# Patient Record
Sex: FEMALE | Race: WHITE | Employment: OTHER | URBAN - METROPOLITAN AREA
[De-identification: names, ages, dates, MRNs, and addresses within clinical notes are randomized per-mention and may not be internally consistent; named-entity substitution may affect disease eponyms.]

---

## 2018-08-29 ENCOUNTER — TELEPHONE (OUTPATIENT)
Dept: SURGERY | Age: 53
End: 2018-08-29

## 2018-08-29 ENCOUNTER — INITIAL CONSULT (OUTPATIENT)
Dept: SURGERY | Age: 53
End: 2018-08-29
Payer: MEDICARE

## 2018-08-29 VITALS
DIASTOLIC BLOOD PRESSURE: 86 MMHG | HEIGHT: 63 IN | BODY MASS INDEX: 34.02 KG/M2 | WEIGHT: 192 LBS | SYSTOLIC BLOOD PRESSURE: 128 MMHG | HEART RATE: 82 BPM

## 2018-08-29 DIAGNOSIS — M79.89 MASS OF SOFT TISSUE OF LEFT UPPER EXTREMITY: ICD-10-CM

## 2018-08-29 PROCEDURE — 99203 OFFICE O/P NEW LOW 30 MIN: CPT | Performed by: SURGERY

## 2018-08-29 NOTE — PROGRESS NOTES
Systems  Review of Systems -  General ROS: negative for - chills, fatigue or malaise  ENT ROS: negative for - hearing change, nasal congestion or nasal discharge  Allergy and Immunology ROS: negative for - hives, itchy/watery eyes or nasal congestion  Hematological and Lymphatic ROS: negative for - blood clots, blood transfusions, bruising or fatigue  Endocrine ROS: negative for - malaise/lethargy, mood swings, palpitations or polydipsia/polyuria  Respiratory ROS: negative for - sputum changes, stridor, tachypnea or wheezing  Cardiovascular ROS: negative for - irregular heartbeat, loss of consciousness, murmur or orthopnea  Gastrointestinal ROS: negative for - constipation, diarrhea, gas/bloating, heartburn or hematemesis  Genito-Urinary ROS: negative for -  genital discharge, genital ulcers or hematuria  Musculoskeletal ROS: negative for - gait disturbance, muscle pain or muscular weakness  Psych/Soc ROS: Neg suicidal ideation or visual/auditory hallucinations    Physical exam:   /86 (Site: Right Arm, Position: Sitting, Cuff Size: Large Adult)   Pulse 82   Ht 5' 3\" (1.6 m)   Wt 192 lb (87.1 kg)   BMI 34.01 kg/m²   General appearance:  NAD  Head: NCAT, PERRLA, EOMI, red conjunctiva  Neck: supple, no masses  Lungs: CTAB, equal chest rise bilateral  Heart: Reg rate  Abdomen: soft, nontender, nondistended  Rectal: No bleeding  Skin; soft tissue mass of the 2  cm, mobile, no drainage, no cellulitis  Gu: no cva tenderness  Extremities: extremities normal, atraumatic, no cyanosis or edema  Psychosocial: No auditory or visual hallucinations      Assessment:  Tapan Hodges is a 52 y.o. female with soft tissue neoplasm of the left upper arm  Patient Active Problem List   Diagnosis    Mass of soft tissue of left upper extremity         Plan:  OR for excision soft tissue neoplasm of left upper arm, likely lipoma vs sebaceous ceys  Discussed the risk, benefits and alternatives of surgery including wound infections, bleeding, scar and recurrent mass formation and the risks of general anesthetic including MI, CVA, sudden death or reactions to anesthetic medications. The patient understands the risks and alternatives and the possibility of converting to an open procedure. All questions were answered to the patient's satisfaction and they freely signed the consent.        Diane Sotomayor MD  1:49 PM  8/29/2018

## 2018-08-31 ENCOUNTER — TELEPHONE (OUTPATIENT)
Dept: SURGERY | Age: 53
End: 2018-08-31

## 2018-08-31 NOTE — TELEPHONE ENCOUNTER
Called pt to schedule follow up appointment for excision soft tissue neoplasm scheduled on 9/4/18. Pt states she would like to schedule later in the year. Confirmed pt would like to cancel. Pt states she will call back when she is ready to schedule  Called surgery scheduling and spoke with Sadie Vizcarra and cancelled surgery fir 9/4/18.  Electronically signed by Jeanine Kilpatrick on 8/31/2018 at 8:42 AM

## 2018-09-13 NOTE — TELEPHONE ENCOUNTER
Patient cancelled procedure. See phone encounter 8.31.18. Patient will be calling back to reschedule procedure at a later date.   Electronically signed by Tracy Marsh on 9/13/18 at 8:17 AM

## 2020-01-15 ENCOUNTER — HOSPITAL ENCOUNTER (OUTPATIENT)
Dept: PULMONOLOGY | Age: 55
Discharge: HOME | End: 2020-01-15
Payer: MEDICARE

## 2020-01-15 PROCEDURE — 94070 EVALUATION OF WHEEZING: CPT

## 2020-01-15 PROCEDURE — 6360000002 HC RX W HCPCS: Performed by: INTERNAL MEDICINE

## 2020-01-15 RX ADMIN — METHACHOLINE CHLORIDE 100 MG: 100 POWDER, FOR SOLUTION RESPIRATORY (INHALATION) at 13:02

## 2020-01-19 NOTE — PROCEDURES
[address]                               PULMONARY FUNCTION    PATIENT NAME: Nano Stephen                  :        1965  MED REC NO:   [id number]                            ROOM:  ACCOUNT NO:   [de-identified]                           ADMIT DATE: 01/15/2020  PROVIDER:     Jj Emmanuel MD    DATE OF PROCEDURE:  01/15/2020    METHACHOLINE CHALLENGE TEST    ATTENDING:  Jj Emmanuel MD    PULMONOLOGIST:  Jj Emmanuel MD    In conclusion, this is a negative methacholine challenge test by  spirometric criteria, but the patient was symptomatic as per the RRT  where there was evidence of loud audible wheezing. The patient  developed some tachypnea. Saturations remain good at 100%, felt dizzy,  lightheaded. Was giving nebulized treatments x2. This certainly has to  be correlated clinically. Asthma is still present in the differential  diagnosis.         Chrissie Flanagan MD    D: 2020 12:08:54       T: 2020 13:31:41     [id number]  Job#: [id number]     Doc#: [id number]    CC:

## 2020-03-31 ENCOUNTER — HOSPITAL ENCOUNTER (EMERGENCY)
Age: 55
Discharge: HOME | End: 2020-03-31
Attending: EMERGENCY MEDICINE
Payer: MEDICARE

## 2020-03-31 VITALS
TEMPERATURE: 99.4 F | OXYGEN SATURATION: 95 % | WEIGHT: 201 LBS | BODY MASS INDEX: 35.61 KG/M2 | DIASTOLIC BLOOD PRESSURE: 82 MMHG | RESPIRATION RATE: 20 BRPM | SYSTOLIC BLOOD PRESSURE: 126 MMHG | HEIGHT: 63 IN | HEART RATE: 99 BPM

## 2020-03-31 PROCEDURE — 99284 EMERGENCY DEPT VISIT MOD MDM: CPT

## 2020-03-31 PROCEDURE — 94664 DEMO&/EVAL PT USE INHALER: CPT

## 2020-03-31 PROCEDURE — 6370000000 HC RX 637 (ALT 250 FOR IP): Performed by: EMERGENCY MEDICINE

## 2020-03-31 PROCEDURE — 2500000003 HC RX 250 WO HCPCS: Performed by: EMERGENCY MEDICINE

## 2020-03-31 PROCEDURE — 94640 AIRWAY INHALATION TREATMENT: CPT

## 2020-03-31 RX ORDER — PREDNISONE 20 MG/1
60 TABLET ORAL ONCE
Status: COMPLETED | OUTPATIENT
Start: 2020-03-31 | End: 2020-03-31

## 2020-03-31 RX ORDER — IPRATROPIUM BROMIDE AND ALBUTEROL SULFATE 2.5; .5 MG/3ML; MG/3ML
3 SOLUTION RESPIRATORY (INHALATION) ONCE
Status: COMPLETED | OUTPATIENT
Start: 2020-03-31 | End: 2020-03-31

## 2020-03-31 RX ORDER — PROMETHAZINE HYDROCHLORIDE AND CODEINE PHOSPHATE 6.25; 1 MG/5ML; MG/5ML
5 SYRUP ORAL 3 TIMES DAILY PRN
Qty: 60 ML | Refills: 0 | Status: SHIPPED | OUTPATIENT
Start: 2020-03-31 | End: 2020-04-04

## 2020-03-31 RX ORDER — PREDNISONE 20 MG/1
TABLET ORAL
Qty: 10 TABLET | Refills: 0 | Status: SHIPPED | OUTPATIENT
Start: 2020-03-31 | End: 2020-04-10

## 2020-03-31 RX ORDER — LIDOCAINE HYDROCHLORIDE 10 MG/ML
5 INJECTION, SOLUTION EPIDURAL; INFILTRATION; INTRACAUDAL; PERINEURAL ONCE
Status: COMPLETED | OUTPATIENT
Start: 2020-03-31 | End: 2020-03-31

## 2020-03-31 RX ADMIN — PREDNISONE 60 MG: 20 TABLET ORAL at 10:01

## 2020-03-31 RX ADMIN — IPRATROPIUM BROMIDE AND ALBUTEROL SULFATE 3 AMPULE: .5; 3 SOLUTION RESPIRATORY (INHALATION) at 10:00

## 2020-03-31 RX ADMIN — LIDOCAINE HYDROCHLORIDE 5 ML: 10 INJECTION, SOLUTION EPIDURAL; INFILTRATION; INTRACAUDAL; PERINEURAL at 10:00

## 2020-03-31 ASSESSMENT — ENCOUNTER SYMPTOMS
SHORTNESS OF BREATH: 1
NAUSEA: 0
COUGH: 1
WHEEZING: 1
ABDOMINAL DISTENTION: 0
EYE PAIN: 0
SORE THROAT: 0
BACK PAIN: 0
EYE REDNESS: 0
DIARRHEA: 0
VOMITING: 0
SPUTUM PRODUCTION: 0
SINUS PRESSURE: 0
EYE DISCHARGE: 0

## 2020-03-31 NOTE — ED PROVIDER NOTES
Patient with history of asthma. She states is been a bit worse of recent. Last steroids several months ago. Follows with Dr. Carrion  for pulmonology. She states she called her primary care physician's office to get a course of steroids but, was told that she go to the hospital.    The history is provided by the patient. Shortness of Breath   Severity:  Mild  Onset quality:  Gradual  Duration:  2 days  Timing:  Constant  Progression:  Waxing and waning  Chronicity:  Recurrent  Relieved by:  Nothing  Worsened by:  Nothing  Ineffective treatments:  Inhaler  Associated symptoms: cough and wheezing    Associated symptoms: no chest pain, no diaphoresis, no ear pain, no fever, no headaches, no neck pain, no rash, no sore throat, no sputum production and no vomiting         Review of Systems   Constitutional: Negative for chills, diaphoresis and fever. HENT: Negative for ear pain, sinus pressure and sore throat. Eyes: Negative for pain, discharge and redness. Respiratory: Positive for cough, shortness of breath and wheezing. Negative for sputum production. Cardiovascular: Negative for chest pain. Gastrointestinal: Negative for abdominal distention, diarrhea, nausea and vomiting. Genitourinary: Negative for dysuria and frequency. Musculoskeletal: Negative for arthralgias, back pain and neck pain. Skin: Negative for rash and wound. Neurological: Negative for weakness and headaches. Hematological: Negative for adenopathy. All other systems reviewed and are negative. Physical Exam  Vitals signs and nursing note reviewed. Constitutional:       Appearance: She is well-developed. HENT:      Head: Normocephalic and atraumatic. Eyes:      Pupils: Pupils are equal, round, and reactive to light. Neck:      Musculoskeletal: Normal range of motion and neck supple. Cardiovascular:      Rate and Rhythm: Normal rate and regular rhythm. Heart sounds: Normal heart sounds. No murmur. PROGRESS NOTES ------------------------------------------  11:44 AM EDT  I have spoken with the patient and discussed todays results, in addition to providing specific details for the plan of care and counseling regarding the diagnosis and prognosis. Their questions are answered at this time and they are agreeable with the plan. I discussed at length with them reasons for immediate return here for re evaluation. They will followup with their primary care physician by calling their office tomorrow. Patient feels much better following treatment. Cough has resolved.  --------------------------------- ADDITIONAL PROVIDER NOTES ---------------------------------  At this time the patient is without objective evidence of an acute process requiring hospitalization or inpatient management. They have remained hemodynamically stable throughout their entire ED visit and are stable for discharge with outpatient follow-up. The plan has been discussed in detail and they are aware of the specific conditions for emergent return, as well as the importance of follow-up. New Prescriptions    PREDNISONE (DELTASONE) 20 MG TABLET    Two tablets daily for 5 days    PROMETHAZINE-CODEINE (PHENERGAN WITH CODEINE) 6.25-10 MG/5ML SYRUP    Take 5 mLs by mouth 3 times daily as needed for Cough for up to 4 days. Diagnosis:  1. Mild intermittent asthma with exacerbation    2. Cough        Disposition:  Patient's disposition: Discharge to home  Patient's condition is stable.           Wenrozina DO Brian  03/31/20 1149

## 2020-04-01 ENCOUNTER — CARE COORDINATION (OUTPATIENT)
Dept: CASE MANAGEMENT | Age: 55
End: 2020-04-01

## 2020-04-03 ENCOUNTER — CARE COORDINATION (OUTPATIENT)
Dept: CASE MANAGEMENT | Age: 55
End: 2020-04-03

## 2021-03-03 ENCOUNTER — HOSPITAL ENCOUNTER (EMERGENCY)
Age: 56
Discharge: HOME | End: 2021-03-03
Attending: EMERGENCY MEDICINE
Payer: MEDICARE

## 2021-03-03 ENCOUNTER — APPOINTMENT (OUTPATIENT)
Dept: CT IMAGING | Age: 56
End: 2021-03-03
Payer: MEDICARE

## 2021-03-03 VITALS
RESPIRATION RATE: 16 BRPM | TEMPERATURE: 98.9 F | DIASTOLIC BLOOD PRESSURE: 103 MMHG | BODY MASS INDEX: 35.44 KG/M2 | HEART RATE: 101 BPM | SYSTOLIC BLOOD PRESSURE: 199 MMHG | OXYGEN SATURATION: 94 % | HEIGHT: 63 IN | WEIGHT: 200 LBS

## 2021-03-03 DIAGNOSIS — S16.1XXA ACUTE STRAIN OF NECK MUSCLE, INITIAL ENCOUNTER: Primary | ICD-10-CM
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
## 2021-03-03 PROCEDURE — 72125 CT NECK SPINE W/O DYE: CPT

## 2021-03-03 PROCEDURE — 99283 EMERGENCY DEPT VISIT LOW MDM: CPT

## 2021-03-03 RX ORDER — MAGNESIUM OXIDE 400 MG/1
400 TABLET ORAL 2 TIMES DAILY
COMMUNITY

## 2021-03-03 RX ORDER — ROSUVASTATIN CALCIUM 10 MG/1
5 TABLET, COATED ORAL DAILY
COMMUNITY

## 2021-03-03 RX ORDER — CETIRIZINE HYDROCHLORIDE 10 MG/1
10 TABLET ORAL DAILY
COMMUNITY

## 2021-03-03 RX ORDER — CALCIUM CARBONATE 200(500)MG
1 TABLET,CHEWABLE ORAL DAILY
COMMUNITY
End: 2021-08-24

## 2021-03-03 RX ORDER — LISINOPRIL 10 MG/1
10 TABLET ORAL DAILY
COMMUNITY

## 2021-03-03 RX ORDER — BUDESONIDE AND FORMOTEROL FUMARATE DIHYDRATE 160; 4.5 UG/1; UG/1
2 AEROSOL RESPIRATORY (INHALATION) 2 TIMES DAILY
COMMUNITY

## 2021-03-03 RX ORDER — MONTELUKAST SODIUM 10 MG/1
10 TABLET ORAL NIGHTLY
COMMUNITY

## 2021-03-03 RX ORDER — CHLORAL HYDRATE 500 MG
1000 CAPSULE ORAL DAILY
COMMUNITY
End: 2021-08-24

## 2021-03-03 RX ORDER — DOCUSATE SODIUM 100 MG/1
200 CAPSULE, LIQUID FILLED ORAL NIGHTLY
COMMUNITY

## 2021-03-03 RX ORDER — FLUOXETINE HYDROCHLORIDE 40 MG/1
40 CAPSULE ORAL DAILY
COMMUNITY

## 2021-03-03 RX ORDER — OYSTER SHELL CALCIUM WITH VITAMIN D 500; 200 MG/1; [IU]/1
1 TABLET, FILM COATED ORAL DAILY
COMMUNITY
End: 2021-08-24

## 2021-03-03 RX ORDER — VITAMIN B COMPLEX
1 CAPSULE ORAL DAILY
COMMUNITY
End: 2021-08-24

## 2021-03-03 RX ORDER — MORPHINE SULFATE 30 MG/1
30 TABLET, FILM COATED, EXTENDED RELEASE ORAL EVERY 12 HOURS
COMMUNITY
End: 2021-08-24

## 2021-03-03 ASSESSMENT — PAIN DESCRIPTION - FREQUENCY: FREQUENCY: CONTINUOUS

## 2021-03-03 ASSESSMENT — PAIN DESCRIPTION - LOCATION: LOCATION: HEAD

## 2021-03-03 ASSESSMENT — ENCOUNTER SYMPTOMS
NAUSEA: 0
VOMITING: 0
FACIAL SWELLING: 0
SHORTNESS OF BREATH: 0
BACK PAIN: 0
ABDOMINAL PAIN: 0
TROUBLE SWALLOWING: 0
CONTUSION: 0

## 2021-03-03 ASSESSMENT — PAIN DESCRIPTION - PAIN TYPE: TYPE: ACUTE PAIN

## 2021-03-03 ASSESSMENT — PAIN DESCRIPTION - DESCRIPTORS: DESCRIPTORS: ACHING;SHARP
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
## 2021-03-23 ENCOUNTER — HOSPITAL ENCOUNTER (OUTPATIENT)
Dept: MRI IMAGING | Age: 56
Discharge: HOME | End: 2021-03-25
Payer: MEDICARE

## 2021-03-23 DIAGNOSIS — M54.2 CERVICALGIA: ICD-10-CM

## 2021-03-23 DIAGNOSIS — M54.50 ACUTE LOW BACK PAIN, UNSPECIFIED BACK PAIN LATERALITY, UNSPECIFIED WHETHER SCIATICA PRESENT: ICD-10-CM

## 2021-03-23 PROCEDURE — 72141 MRI NECK SPINE W/O DYE: CPT

## 2021-03-23 PROCEDURE — 72148 MRI LUMBAR SPINE W/O DYE: CPT

## 2021-08-24 ENCOUNTER — OFFICE VISIT (OUTPATIENT)
Dept: ORTHOPEDIC SURGERY | Age: 56
End: 2021-08-24
Payer: MEDICARE

## 2021-08-24 VITALS — TEMPERATURE: 98 F | WEIGHT: 176 LBS | HEIGHT: 63 IN | BODY MASS INDEX: 31.18 KG/M2

## 2021-08-24 DIAGNOSIS — M70.62 TROCHANTERIC BURSITIS, LEFT HIP: Primary | ICD-10-CM

## 2021-08-24 PROCEDURE — 99203 OFFICE O/P NEW LOW 30 MIN: CPT | Performed by: ORTHOPAEDIC SURGERY

## 2021-08-24 PROCEDURE — 4004F PT TOBACCO SCREEN RCVD TLK: CPT | Performed by: ORTHOPAEDIC SURGERY

## 2021-08-24 PROCEDURE — 20610 DRAIN/INJ JOINT/BURSA W/O US: CPT | Performed by: ORTHOPAEDIC SURGERY

## 2021-08-24 PROCEDURE — G8417 CALC BMI ABV UP PARAM F/U: HCPCS | Performed by: ORTHOPAEDIC SURGERY

## 2021-08-24 PROCEDURE — 3017F COLORECTAL CA SCREEN DOC REV: CPT | Performed by: ORTHOPAEDIC SURGERY

## 2021-08-24 PROCEDURE — G8427 DOCREV CUR MEDS BY ELIG CLIN: HCPCS | Performed by: ORTHOPAEDIC SURGERY

## 2021-08-24 RX ORDER — GABAPENTIN 100 MG/1
CAPSULE ORAL
COMMUNITY
Start: 2021-08-05

## 2021-08-24 RX ORDER — TRIAMCINOLONE ACETONIDE 40 MG/ML
40 INJECTION, SUSPENSION INTRA-ARTICULAR; INTRAMUSCULAR ONCE
Status: COMPLETED | OUTPATIENT
Start: 2021-08-24 | End: 2021-08-24

## 2021-08-24 RX ADMIN — TRIAMCINOLONE ACETONIDE 40 MG: 40 INJECTION, SUSPENSION INTRA-ARTICULAR; INTRAMUSCULAR at 10:33

## 2021-11-19 ENCOUNTER — APPOINTMENT (OUTPATIENT)
Dept: GENERAL RADIOLOGY | Age: 56
End: 2021-11-19
Payer: MEDICARE

## 2021-11-19 ENCOUNTER — HOSPITAL ENCOUNTER (EMERGENCY)
Age: 56
Discharge: LEFT BEFORE DISCHARGE | End: 2021-11-19
Payer: MEDICARE

## 2021-11-19 VITALS
DIASTOLIC BLOOD PRESSURE: 56 MMHG | TEMPERATURE: 99 F | SYSTOLIC BLOOD PRESSURE: 102 MMHG | OXYGEN SATURATION: 92 % | RESPIRATION RATE: 19 BRPM | HEART RATE: 116 BPM

## 2021-11-19 LAB
ANION GAP SERPL CALCULATED.3IONS-SCNC: 12 MMOL/L (ref 7–16)
BASOPHILS ABSOLUTE: 0.02 E9/L (ref 0–0.2)
BASOPHILS RELATIVE PERCENT: 0.4 % (ref 0–2)
BUN BLDV-MCNC: 17 MG/DL (ref 6–20)
CALCIUM SERPL-MCNC: 8.9 MG/DL (ref 8.6–10.2)
CHLORIDE BLD-SCNC: 102 MMOL/L (ref 98–107)
CO2: 24 MMOL/L (ref 22–29)
CREAT SERPL-MCNC: 1.1 MG/DL (ref 0.5–1)
D DIMER: 313 NG/ML DDU
EOSINOPHILS ABSOLUTE: 0.03 E9/L (ref 0.05–0.5)
EOSINOPHILS RELATIVE PERCENT: 0.7 % (ref 0–6)
GFR AFRICAN AMERICAN: >60
GFR NON-AFRICAN AMERICAN: 51 ML/MIN/1.73
GLUCOSE BLD-MCNC: 101 MG/DL (ref 74–99)
HCT VFR BLD CALC: 37.3 % (ref 34–48)
HEMOGLOBIN: 12 G/DL (ref 11.5–15.5)
IMMATURE GRANULOCYTES #: 0.02 E9/L
IMMATURE GRANULOCYTES %: 0.4 % (ref 0–5)
LYMPHOCYTES ABSOLUTE: 1.01 E9/L (ref 1.5–4)
LYMPHOCYTES RELATIVE PERCENT: 22.2 % (ref 20–42)
MCH RBC QN AUTO: 27 PG (ref 26–35)
MCHC RBC AUTO-ENTMCNC: 32.2 % (ref 32–34.5)
MCV RBC AUTO: 83.8 FL (ref 80–99.9)
MONOCYTES ABSOLUTE: 0.38 E9/L (ref 0.1–0.95)
MONOCYTES RELATIVE PERCENT: 8.4 % (ref 2–12)
NEUTROPHILS ABSOLUTE: 3.08 E9/L (ref 1.8–7.3)
NEUTROPHILS RELATIVE PERCENT: 67.9 % (ref 43–80)
PDW BLD-RTO: 12.5 FL (ref 11.5–15)
PLATELET # BLD: 111 E9/L (ref 130–450)
PMV BLD AUTO: 10.3 FL (ref 7–12)
POTASSIUM REFLEX MAGNESIUM: 4.7 MMOL/L (ref 3.5–5)
PRO-BNP: <5 PG/ML (ref 0–125)
RBC # BLD: 4.45 E12/L (ref 3.5–5.5)
SARS-COV-2, NAAT: DETECTED
SODIUM BLD-SCNC: 138 MMOL/L (ref 132–146)
TROPONIN, HIGH SENSITIVITY: <6 NG/L (ref 0–9)
WBC # BLD: 4.5 E9/L (ref 4.5–11.5)

## 2021-11-19 PROCEDURE — 87635 SARS-COV-2 COVID-19 AMP PRB: CPT

## 2021-11-19 PROCEDURE — 71046 X-RAY EXAM CHEST 2 VIEWS: CPT

## 2021-11-19 PROCEDURE — 4500000002 HC ER NO CHARGE

## 2021-11-19 PROCEDURE — 85025 COMPLETE CBC W/AUTO DIFF WBC: CPT

## 2021-11-19 PROCEDURE — 80048 BASIC METABOLIC PNL TOTAL CA: CPT

## 2021-11-19 PROCEDURE — 85378 FIBRIN DEGRADE SEMIQUANT: CPT

## 2021-11-19 PROCEDURE — 84484 ASSAY OF TROPONIN QUANT: CPT

## 2021-11-19 PROCEDURE — 83880 ASSAY OF NATRIURETIC PEPTIDE: CPT

## 2021-11-19 PROCEDURE — 93005 ELECTROCARDIOGRAM TRACING: CPT | Performed by: NURSE PRACTITIONER

## 2021-11-19 NOTE — ED NOTES
Patient's name called X 3 with no answer. Called CT to see if pt was over there and CT tech French Weathers stated that the pt was not over in CT.      Reagan Underwood RN  11/19/21 0646

## 2021-11-19 NOTE — ED TRIAGE NOTES
FIRST PROVIDER CONTACT ASSESSMENT NOTE           Department of Emergency Medicine                 First Provider Note            21  12:26 PM EST    Date of Encounter: No admission date for patient encounter. Patient Name: Peace Harley  : 1965  MRN: [id number]    Chief Complaint: No chief complaint on file. History of Present Illness:   Peace Harley is a 60 y.o. female who presents to the ED for chest pain, sob, cough, exposure to covid 19. Symptoms started 7 days ago. Reports low grade fever as well. History of asthma. Did not receive the covid 19 vaccine. Focused Physical Exam:  VS:    ED Triage Vitals [21 1159]   BP Temp Temp Source Pulse Resp SpO2 Height Weight   -- 96.2 °F (35.7 °C) Temporal 120 -- 96 % -- --        Physical Ex: Constitutional: Alert and non-toxic. Medical History:  has a past medical history of Arthritis, Asthma, Fibromyalgia, Hyperlipidemia, Osteoporosis, and Pancreatitis. Surgical History:  has a past surgical history that includes Cervix surgery; Hysterectomy; Cholecystectomy; Appendectomy; and Tonsillectomy. Social History:  reports that she has never smoked. She does not have any smokeless tobacco history on file. She reports that she does not drink alcohol. Family History: family history is not on file.     Allergies: Sumatriptan     Initial Plan of Care: Initiate Treatment-Testing, Proceed toTreatment Area When Bed Available for ED Attending/MLP to Continue Care      ---END OF FIRST PROVIDER CONTACT ASSESSMENT NOTE---  Electronically signed by REGGIE Villavicencio CNP   DD: 21

## 2021-11-20 LAB
EKG ATRIAL RATE: 113 BPM
EKG P AXIS: 43 DEGREES
EKG P-R INTERVAL: 158 MS
EKG Q-T INTERVAL: 336 MS
EKG QRS DURATION: 80 MS
EKG QTC CALCULATION (BAZETT): 460 MS
EKG R AXIS: 50 DEGREES
EKG T AXIS: 23 DEGREES
EKG VENTRICULAR RATE: 113 BPM

## 2021-11-22 ENCOUNTER — CARE COORDINATION (OUTPATIENT)
Dept: CARE COORDINATION | Age: 56
End: 2021-11-22

## 2021-11-22 NOTE — CARE COORDINATION
Patient contacted regarding COVID-19 risk, exposure, diagnosis, pulse oximeter ordered at discharge and monoclonal antibody infusion follow up. Discussed COVID-19 related testing which was available at this time. Test results were positive. Patient informed of results, if available? Yes. LPN Care Coordinator contacted the patient by telephone to perform post discharge assessment. Call within 2 business days of discharge: Yes. Verified name and  with patient as identifiers. Provided introduction to self, and explanation of the CTN/ACM role, and reason for call due to risk factors for infection and/or exposure to COVID-19. Symptoms reviewed with patient who verbalized the following symptoms: fatigue, pain or aching joints and cough. Due to no new or worsening symptoms encounter was not routed to provider for escalation. Discussed follow-up appointments. If no appointment was previously scheduled, appointment scheduling offered: No.  [facility] follow up appointment(s): No future appointments. Non-[facility] follow up appointment(s): pt states she is feeling ok. Reviewed cdc guidelines and reviewed pulse ox usage and pt is to follow up with pcp reminded pt to return to er if symptoms persist or worsen    Non-face-to-face services provided:  Obtained and reviewed discharge summary and/or continuity of care documents     Advance Care Planning:   Does patient have an Advance Directive:  not on file. Educated patient about risk for severe COVID-19 due to risk factors according to CDC guidelines. LPN CC reviewed discharge instructions, medical action plan and red flag symptoms with the patient who verbalized understanding. Discussed COVID vaccination status: Yes. Education provided on COVID-19 vaccination as appropriate. Discussed exposure protocols and quarantine with CDC Guidelines.  Patient was given an opportunity to verbalize any questions and concerns and agrees to contact LPN CC or health care provider for questions related to their healthcare. Reviewed and educated patient on any new and changed medications related to discharge diagnosis     Was patient discharged with a pulse oximeter? No Discussed and confirmed pulse oximeter discharge instructions and when to notify provider or seek emergency care. LPN CC provided contact information. No further follow-up call identified based on severity of symptoms and risk factors.

## 2022-03-04 ENCOUNTER — HOSPITAL ENCOUNTER (OUTPATIENT)
Dept: GENERAL RADIOLOGY | Age: 57
Discharge: HOME | End: 2022-03-06
Payer: MEDICARE

## 2022-03-04 ENCOUNTER — HOSPITAL ENCOUNTER (OUTPATIENT)
Age: 57
Discharge: HOME | End: 2022-03-06
Payer: MEDICARE

## 2022-03-04 DIAGNOSIS — R91.8 OTHER NONSPECIFIC ABNORMAL FINDING OF LUNG FIELD: ICD-10-CM

## 2022-03-04 PROCEDURE — 71046 X-RAY EXAM CHEST 2 VIEWS: CPT

## 2022-04-15 DIAGNOSIS — R20.0 NUMBNESS AND TINGLING IN LEFT ARM: Primary | ICD-10-CM

## 2022-04-15 DIAGNOSIS — R20.2 NUMBNESS AND TINGLING IN LEFT ARM: Primary | ICD-10-CM

## 2022-04-20 ENCOUNTER — OFFICE VISIT (OUTPATIENT)
Dept: PHYSICAL MEDICINE AND REHAB | Age: 57
End: 2022-04-20
Payer: MEDICARE

## 2022-04-20 VITALS — HEIGHT: 63 IN | BODY MASS INDEX: 35.44 KG/M2 | WEIGHT: 200 LBS

## 2022-04-20 DIAGNOSIS — R20.2 NUMBNESS AND TINGLING IN LEFT ARM: ICD-10-CM

## 2022-04-20 DIAGNOSIS — R20.0 NUMBNESS AND TINGLING IN LEFT ARM: Primary | ICD-10-CM

## 2022-04-20 DIAGNOSIS — R20.0 NUMBNESS AND TINGLING IN LEFT ARM: ICD-10-CM

## 2022-04-20 DIAGNOSIS — R20.2 NUMBNESS AND TINGLING IN LEFT ARM: Primary | ICD-10-CM

## 2022-04-20 PROCEDURE — 95886 MUSC TEST DONE W/N TEST COMP: CPT | Performed by: PHYSICAL MEDICINE & REHABILITATION

## 2022-04-20 PROCEDURE — 95909 NRV CNDJ TST 5-6 STUDIES: CPT | Performed by: PHYSICAL MEDICINE & REHABILITATION

## 2022-04-20 PROCEDURE — 99203 OFFICE O/P NEW LOW 30 MIN: CPT | Performed by: PHYSICAL MEDICINE & REHABILITATION

## 2022-04-20 PROCEDURE — G8428 CUR MEDS NOT DOCUMENT: HCPCS | Performed by: PHYSICAL MEDICINE & REHABILITATION

## 2022-04-20 PROCEDURE — G8417 CALC BMI ABV UP PARAM F/U: HCPCS | Performed by: PHYSICAL MEDICINE & REHABILITATION
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
## 2022-04-20 NOTE — PATIENT INSTRUCTIONS
Electrodiagnotic Laboratory  Accredited by the [facility] with Exemplary status  EUNICE Vasquez Ace, D.O. [facility]  [address] [facility] [place]  Phone: [phone]  Fax: [phone]        Today you had an electrodiagnostic exam which included nerve conduction studies (NCS) and electromyography (EMG). This test evaluated the electrical activity of your nerves and muscles to help determine if you have a nerve or muscle disease. This test can help determine the location and type of a nerve or muscle problem. This will help your referring doctor diagnose your condition and determine the appropriate next step in your treatment plan. After your test:    1. There are no long lasting side effects of the test.     2. You may resume your normal activities without restrictions. 3.  Resume any medications that were stopped for the test.     4  If you have sore areas or bruising in your muscles where the needle was placed, apply a cold pack to the sore area for 15-20 minutes three to four times a day as needed for pain. The soreness should go away in about 1-2 days. 5. Your results were provided  Briefly at the end of your test and the final detailed report will be provided to your referring physician, and/or primary care physician and any other parties you requested within 1-2 days of the examination. You may wish to contact your referring provider after a few days to determine what they would like you to do next. 6.  Please call [phone] with any questions or concerns and if you develop increased body temperature/fever, swelling, tenderness, increased pain and/or drainage from the sites where the needle was placed. Thank you for choosing us for your health care needs.

## 2023-08-21 NOTE — PROGRESS NOTES
10 mg by mouth daily, Disp: , Rfl:     influenza virus trivalent vaccine (FLUZONE) injection, Inject 0.5 mLs into the muscle once Given 1/2021, Disp: , Rfl:     albuterol (PROVENTIL HFA;VENTOLIN HFA) 108 (90 BASE) MCG/ACT inhaler, Inhale 2 puffs into the lungs every 6 hours as needed for Wheezing., Disp: , Rfl:     cyclobenzaprine (FLEXERIL) 10 MG tablet, Take 10 mg by mouth 2 times daily as needed for Muscle spasms , Disp: , Rfl:     oxyCODONE-acetaminophen (PERCOCET)  MG per tablet, Take 1 tablet by mouth every 4 hours as needed for Pain. , Disp: , Rfl:   Allergies   Allergen Reactions    Sumatriptan      Other reaction(s): chest pain, Other: See Comments, Unknown     Social History     Socioeconomic History    Marital status:      Spouse name: Not on file    Number of children: Not on file    Years of education: Not on file    Highest education level: Not on file   Occupational History    Not on file   Tobacco Use    Smoking status: Never Smoker   Substance and Sexual Activity    Alcohol use: No    Drug use: Not on file    Sexual activity: Not on file   Other Topics Concern    Not on file   Social History Narrative    Not on file     Social Determinants of Health     Financial Resource Strain:     Difficulty of Paying Living Expenses:    Food Insecurity:     Worried About Running Out of Food in the Last Year:     Ran Out of Food in the Last Year:    Transportation Needs:     Lack of Transportation (Medical):      Lack of Transportation (Non-Medical):    Physical Activity:     Days of Exercise per Week:     Minutes of Exercise per Session:    Stress:     Feeling of Stress :    Social Connections:     Frequency of Communication with Friends and Family:     Frequency of Social Gatherings with Friends and Family:     Attends [employer] Services:     Active Member of Clubs or Organizations:     Attends Club or Organization Meetings:     Marital Status:    Intimate Partner Violence:     Fear of Current or Ex-Partner:     Emotionally Abused:     Physically Abused:     Sexually Abused:      No family history on file. REVIEW OF SYSTEMS:     General/Constitution:  (-)weight loss, (-)fever, (-)chills, (-)weakness. Skin: (-) rash,(-) psoriasis,(-) eczema, (-)skin cancer. Musculoskeletal: (-) fractures,  (-) dislocations,(-) collagen vascular disease, (-) fibromyalgia, (-) multiple sclerosis, (-) muscular dystrophy, (-) RSD,(-) joint pain (-)swelling, (-) joint pain,swelling. Neurologic: (-) epilepsy, (-)seizures,(-) brain tumor,(-) TIA, (-)stroke, (-)headaches, (-)Parkinson disease,(-) memory loss, (-) LOC. Cardiovascular: (-) Chest pain, (-) swelling in legs/feet, (-) SOB, (-) cramping in legs/feet with walking. Respiratory: (-) SOB, (-) Coughing, (-) night sweats. GI: (-) nausea, (-) vomiting, (-) diarrhea, (-) blood in stool, (-) gastric ulcer. Psychiatric: (-) Depression, (-) Anxiety, (-) bipolar disease, (-) Alzheimer's Disease  Allergic/Immunologic: (-) allergies latex, (-) allergies metal, (-) skin sensitivity. Hematlogic: (-) anemia, (-) blood transfusion, (-) DVT/PE, (-) Clotting disorders      Subjective:    Constitution:    Temp 98 °F (36.7 °C)   Ht 5' 3\" (1.6 m)   Wt 176 lb (79.8 kg)   BMI 31.18 kg/m²     Psycihatric:  The patient is alert and oriented x 3, appears to be stated age and in no distress. Respiratory:  Respiratory effort is not labored. Patient is not gasping. Palpation of the chest reveals no tactile fremitus. Skin:  Upon inspection: the skin appears warm, dry and intact. There is not a previous scar over the affected area. There is not any cellulitis, lymphedema or cutaneous lesions noted in the lower extremities. Upon palpation there is no induration noted. Neurologic:  Motor exam of the lower extremities show ; quadriceps, hamstrings, foot dorsi and plantar flexors intact R.  5/5 and L. 5/5.  Deep tendon reflexes are 2/4 at the knees and 2/4 at the ankles with strong extensor hallicus longus motor strength bilaterally. Sensory to both feet is intact to all sensory roots. Cardiovascular: The vascular exam is normal and is well perfused to distal extremities. Distal pulses DP/PT: R. 2+; L. 2+. There is cap refill noted less than two seconds in all digits. There is not edema of the bilateral lower extremities. There is not varicosities noted in the distal extremities. Lymph:  Upon palpation,  there is no lymphadenopathy noted in bilateral lower extremities. Musculoskeletal:  Gait: antalgic;  Examination of the digits and nails reveal no cyanosis or clubbing    Lumbar exam:  On visual inspection, there is not deformity of the spine. full range of motion, no tenderness, palpable spasm or pain on motion. Special tests: Straight Leg Raise positive, Vicky test positive. Hip exam:  Upon visual inspection there is not a deformity noted. Patient complains of tenderness at the  greater trochanter. Exam of the left hip shows none leg length discrepancy. Range of motion of the involved/uninvolved hip is 25 degrees internal rotation and 35 degrees external rotation/25 degrees internal rotation and 35 degrees external rotation, the hip joint is stable to testing. Strength of the lower extremity is limited by pain. The patient does not have ipsilateral knee pain, and is described as  none. Hip exam- Gait: antalgic; Strength: Hip Flexors 5/5; Hip Abductors 5/5; Hip Adduction 5/5. Knee exam :  Upon visual inspection there is not deformity noted. She does not have  pain on motion, there is not tenderness over the  medial, lateral, anterior region. Range of motion of R. Knee is 0 to 120, and L. Knee is 0 to 120. there are not any masses, there is not ligamentous instability, there is not  deformity noted.       Xrays:  Normal hip findings    Radiographic findings reviewed with patient    Impression:  Encounter Diagnoses Name Primary?  Trochanteric bursitis, left hip Yes       Plan:  Natural history and expected course discussed. Questions answered. Educational materials distributed. Home exercises discussed. NSAIDs per medication orders. Verbal and written consent was obtained by the patient. The patient was positioned on Her unaffected side and prepped with alcohol. She was injected with 9ml of 0.25% Marcaine and 1ml of Kenalog 40 mg in the Left greater trochanter of the hip. She tolerated the injection well and we will see them back prn  If no relief, discussed with patient it may be coming from her back  Fu prn. no

## 2023-12-29 RX ORDER — CHLORHEXIDINE GLUCONATE 4 %
LIQUID (ML) TOPICAL NIGHTLY PRN
COMMUNITY

## 2023-12-29 RX ORDER — FAMOTIDINE 40 MG/1
40 TABLET, FILM COATED ORAL NIGHTLY
COMMUNITY

## 2024-01-05 ENCOUNTER — ANESTHESIA EVENT (OUTPATIENT)
Dept: OPERATING ROOM | Age: 59
End: 2024-01-05
Payer: MEDICARE

## 2024-01-08 ASSESSMENT — LIFESTYLE VARIABLES: SMOKING_STATUS: 0

## 2024-01-08 NOTE — ANESTHESIA PRE PROCEDURE
Department of Anesthesiology  Preprocedure Note       Name:  Zahra Cavazos   Age:  58 y.o.  :  1965                                          MRN:  [id number]         Date:  2024      Surgeon: Surgeon(s):  Chau Ornelas DPM    Procedure: Procedure(s):  MODIFIED CORDOBA BUNIONECTOMY WITH ALONA OSTEOTOMY AND AKIN OSTEOTOMY LEFT FOOT    Medications prior to admission:   Prior to Admission medications    Medication Sig Start Date End Date Taking? Authorizing Provider   famotidine (PEPCID) 40 MG tablet Take 1 tablet by mouth at bedtime   Yes Delmi Dobbs MD   Tiotropium Bromide Monohydrate (SPIRIVA HANDIHALER IN) Inhale into the lungs Daily   Yes Delmi Dobbs MD   Melatonin 12 MG TABS Take by mouth nightly as needed   Yes Delmi Dobbs MD   FLUoxetine (PROZAC) 40 MG capsule Take 1 capsule by mouth daily    Delmi Dobbs MD   magnesium oxide (MAG-OX) 400 MG tablet Take 1 tablet by mouth nightly    Delmi Dobbs MD   montelukast (SINGULAIR) 10 MG tablet Take 1 tablet by mouth nightly    Delmi Dobbs MD   docusate sodium (COLACE) 100 MG capsule Take 2 capsules by mouth nightly    Delmi Dobbs MD   budesonide-formoterol (SYMBICORT) 160-4.5 MCG/ACT AERO Inhale 2 puffs into the lungs 2 times daily    Delmi Dobbs MD   cetirizine (ZYRTEC) 10 MG tablet Take 1 tablet by mouth daily    Delmi Dobbs MD   influenza virus trivalent vaccine (FLUZONE) injection Inject 0.5 mLs into the muscle once Given 2021    Delmi Dobbs MD   albuterol (PROVENTIL HFA;VENTOLIN HFA) 108 (90 BASE) MCG/ACT inhaler Inhale 2 puffs into the lungs every 6 hours as needed for Wheezing    Delmi Dobbs MD       Current medications:    No current facility-administered medications for this encounter.     Current Outpatient Medications   Medication Sig Dispense Refill    famotidine (PEPCID) 40 MG tablet Take 1 tablet by mouth at bedtime

## 2024-01-09 ENCOUNTER — PREP FOR PROCEDURE (OUTPATIENT)
Dept: PODIATRY | Age: 59
End: 2024-01-09

## 2024-01-09 ENCOUNTER — ANESTHESIA (OUTPATIENT)
Dept: OPERATING ROOM | Age: 59
End: 2024-01-09
Payer: MEDICARE

## 2024-01-09 ENCOUNTER — HOSPITAL ENCOUNTER (OUTPATIENT)
Age: 59
Setting detail: OUTPATIENT SURGERY
Discharge: HOME | End: 2024-01-09
Attending: PODIATRIST | Admitting: PODIATRIST
Payer: MEDICARE

## 2024-01-09 ENCOUNTER — HOSPITAL ENCOUNTER (OUTPATIENT)
Dept: OPERATING ROOM | Age: 59
Setting detail: OUTPATIENT SURGERY
Discharge: HOME | End: 2024-01-09
Attending: PODIATRIST
Payer: MEDICARE

## 2024-01-09 VITALS
RESPIRATION RATE: 16 BRPM | WEIGHT: 194 LBS | DIASTOLIC BLOOD PRESSURE: 79 MMHG | SYSTOLIC BLOOD PRESSURE: 157 MMHG | TEMPERATURE: 97.6 F | HEART RATE: 95 BPM | OXYGEN SATURATION: 95 % | HEIGHT: 63 IN | BODY MASS INDEX: 34.38 KG/M2

## 2024-01-09 DIAGNOSIS — M20.62 DEFORMITY OF TOE, LEFT: ICD-10-CM

## 2024-01-09 DIAGNOSIS — M20.12 HALLUX VALGUS, LEFT: Primary | ICD-10-CM

## 2024-01-09 PROCEDURE — 3700000000 HC ANESTHESIA ATTENDED CARE: Performed by: PODIATRIST

## 2024-01-09 PROCEDURE — 3700000001 HC ADD 15 MINUTES (ANESTHESIA): Performed by: PODIATRIST

## 2024-01-09 PROCEDURE — 3600000013 HC SURGERY LEVEL 3 ADDTL 15MIN: Performed by: PODIATRIST

## 2024-01-09 PROCEDURE — 6360000002 HC RX W HCPCS: Performed by: NURSE ANESTHETIST, CERTIFIED REGISTERED

## 2024-01-09 PROCEDURE — C1713 ANCHOR/SCREW BN/BN,TIS/BN: HCPCS | Performed by: PODIATRIST

## 2024-01-09 PROCEDURE — 2580000003 HC RX 258: Performed by: PODIATRIST

## 2024-01-09 PROCEDURE — 2500000003 HC RX 250 WO HCPCS: Performed by: NURSE ANESTHETIST, CERTIFIED REGISTERED

## 2024-01-09 PROCEDURE — 2709999900 HC NON-CHARGEABLE SUPPLY: Performed by: PODIATRIST

## 2024-01-09 PROCEDURE — 6360000002 HC RX W HCPCS: Performed by: PODIATRIST

## 2024-01-09 PROCEDURE — 2580000003 HC RX 258: Performed by: STUDENT IN AN ORGANIZED HEALTH CARE EDUCATION/TRAINING PROGRAM

## 2024-01-09 PROCEDURE — 6370000000 HC RX 637 (ALT 250 FOR IP): Performed by: ANESTHESIOLOGY

## 2024-01-09 PROCEDURE — 3600000003 HC SURGERY LEVEL 3 BASE: Performed by: PODIATRIST

## 2024-01-09 PROCEDURE — 2500000003 HC RX 250 WO HCPCS: Performed by: PODIATRIST

## 2024-01-09 PROCEDURE — 7100000011 HC PHASE II RECOVERY - ADDTL 15 MIN: Performed by: PODIATRIST

## 2024-01-09 PROCEDURE — 7100000010 HC PHASE II RECOVERY - FIRST 15 MIN: Performed by: PODIATRIST

## 2024-01-09 DEVICE — IMPLANTABLE DEVICE
Type: IMPLANTABLE DEVICE | Site: FOOT | Status: FUNCTIONAL
Brand: FUSEFORCE

## 2024-01-09 DEVICE — K-WIRE, DOUBLE TROCAR POINT
Type: IMPLANTABLE DEVICE | Site: FOOT | Status: FUNCTIONAL
Brand: REMOTION

## 2024-01-09 RX ORDER — HYDROCODONE BITARTRATE AND ACETAMINOPHEN 5; 325 MG/1; MG/1
1 TABLET ORAL EVERY 6 HOURS PRN
Status: DISCONTINUED | OUTPATIENT
Start: 2024-01-09 | End: 2024-01-09 | Stop reason: HOSPADM

## 2024-01-09 RX ORDER — MEPERIDINE HYDROCHLORIDE 25 MG/ML
12.5 INJECTION INTRAMUSCULAR; INTRAVENOUS; SUBCUTANEOUS ONCE
Status: DISCONTINUED | OUTPATIENT
Start: 2024-01-09 | End: 2024-01-09 | Stop reason: HOSPADM

## 2024-01-09 RX ORDER — PROPOFOL 10 MG/ML
INJECTION, EMULSION INTRAVENOUS CONTINUOUS PRN
Status: DISCONTINUED | OUTPATIENT
Start: 2024-01-09 | End: 2024-01-09 | Stop reason: SDUPTHER

## 2024-01-09 RX ORDER — DIPHENHYDRAMINE HYDROCHLORIDE 50 MG/ML
12.5 INJECTION INTRAMUSCULAR; INTRAVENOUS
Status: DISCONTINUED | OUTPATIENT
Start: 2024-01-09 | End: 2024-01-09 | Stop reason: HOSPADM

## 2024-01-09 RX ORDER — SODIUM CHLORIDE 0.9 % (FLUSH) 0.9 %
5-40 SYRINGE (ML) INJECTION PRN
Status: DISCONTINUED | OUTPATIENT
Start: 2024-01-09 | End: 2024-01-09 | Stop reason: HOSPADM

## 2024-01-09 RX ORDER — SODIUM CHLORIDE 0.9 % (FLUSH) 0.9 %
5-40 SYRINGE (ML) INJECTION EVERY 12 HOURS SCHEDULED
Status: DISCONTINUED | OUTPATIENT
Start: 2024-01-09 | End: 2024-01-09 | Stop reason: HOSPADM

## 2024-01-09 RX ORDER — DIPHENHYDRAMINE HYDROCHLORIDE 50 MG/ML
INJECTION INTRAMUSCULAR; INTRAVENOUS PRN
Status: DISCONTINUED | OUTPATIENT
Start: 2024-01-09 | End: 2024-01-09 | Stop reason: SDUPTHER

## 2024-01-09 RX ORDER — SODIUM CHLORIDE 9 MG/ML
INJECTION, SOLUTION INTRAVENOUS PRN
Status: DISCONTINUED | OUTPATIENT
Start: 2024-01-09 | End: 2024-01-09 | Stop reason: HOSPADM

## 2024-01-09 RX ORDER — MORPHINE SULFATE 2 MG/ML
1 INJECTION, SOLUTION INTRAMUSCULAR; INTRAVENOUS EVERY 5 MIN PRN
Status: DISCONTINUED | OUTPATIENT
Start: 2024-01-09 | End: 2024-01-09 | Stop reason: HOSPADM

## 2024-01-09 RX ORDER — SODIUM CHLORIDE, SODIUM LACTATE, POTASSIUM CHLORIDE, CALCIUM CHLORIDE 600; 310; 30; 20 MG/100ML; MG/100ML; MG/100ML; MG/100ML
INJECTION, SOLUTION INTRAVENOUS CONTINUOUS
Status: DISCONTINUED | OUTPATIENT
Start: 2024-01-09 | End: 2024-01-09 | Stop reason: HOSPADM

## 2024-01-09 RX ORDER — SODIUM CHLORIDE 0.9 % (FLUSH) 0.9 %
5-40 SYRINGE (ML) INJECTION PRN
Status: CANCELLED | OUTPATIENT
Start: 2024-01-09

## 2024-01-09 RX ORDER — LIDOCAINE HYDROCHLORIDE 20 MG/ML
INJECTION, SOLUTION INTRAVENOUS PRN
Status: DISCONTINUED | OUTPATIENT
Start: 2024-01-09 | End: 2024-01-09 | Stop reason: SDUPTHER

## 2024-01-09 RX ORDER — OXYCODONE HYDROCHLORIDE AND ACETAMINOPHEN 5; 325 MG/1; MG/1
1 TABLET ORAL EVERY 6 HOURS PRN
Qty: 28 TABLET | Refills: 0 | Status: SHIPPED | OUTPATIENT
Start: 2024-01-09 | End: 2024-01-16

## 2024-01-09 RX ORDER — DROPERIDOL 2.5 MG/ML
1.25 INJECTION, SOLUTION INTRAMUSCULAR; INTRAVENOUS
Status: DISCONTINUED | OUTPATIENT
Start: 2024-01-09 | End: 2024-01-09 | Stop reason: HOSPADM

## 2024-01-09 RX ORDER — ONDANSETRON 2 MG/ML
INJECTION INTRAMUSCULAR; INTRAVENOUS PRN
Status: DISCONTINUED | OUTPATIENT
Start: 2024-01-09 | End: 2024-01-09 | Stop reason: SDUPTHER

## 2024-01-09 RX ORDER — SODIUM CHLORIDE 9 MG/ML
INJECTION, SOLUTION INTRAVENOUS PRN
Status: CANCELLED | OUTPATIENT
Start: 2024-01-09

## 2024-01-09 RX ORDER — FENTANYL CITRATE 0.05 MG/ML
50 INJECTION, SOLUTION INTRAMUSCULAR; INTRAVENOUS EVERY 5 MIN PRN
Status: DISCONTINUED | OUTPATIENT
Start: 2024-01-09 | End: 2024-01-09 | Stop reason: HOSPADM

## 2024-01-09 RX ORDER — KETAMINE HCL IN NACL, ISO-OSM 100MG/10ML
SYRINGE (ML) INJECTION PRN
Status: DISCONTINUED | OUTPATIENT
Start: 2024-01-09 | End: 2024-01-09 | Stop reason: SDUPTHER

## 2024-01-09 RX ORDER — MIDAZOLAM HYDROCHLORIDE 1 MG/ML
INJECTION INTRAMUSCULAR; INTRAVENOUS PRN
Status: DISCONTINUED | OUTPATIENT
Start: 2024-01-09 | End: 2024-01-09 | Stop reason: SDUPTHER

## 2024-01-09 RX ORDER — FENTANYL CITRATE 50 UG/ML
INJECTION, SOLUTION INTRAMUSCULAR; INTRAVENOUS PRN
Status: DISCONTINUED | OUTPATIENT
Start: 2024-01-09 | End: 2024-01-09 | Stop reason: SDUPTHER

## 2024-01-09 RX ORDER — OXYCODONE HYDROCHLORIDE AND ACETAMINOPHEN 5; 325 MG/1; MG/1
1 TABLET ORAL EVERY 4 HOURS PRN
Status: DISCONTINUED | OUTPATIENT
Start: 2024-01-09 | End: 2024-01-09 | Stop reason: HOSPADM

## 2024-01-09 RX ORDER — SODIUM CHLORIDE 0.9 % (FLUSH) 0.9 %
5-40 SYRINGE (ML) INJECTION EVERY 12 HOURS SCHEDULED
Status: CANCELLED | OUTPATIENT
Start: 2024-01-09

## 2024-01-09 RX ORDER — DEXAMETHASONE SODIUM PHOSPHATE 10 MG/ML
INJECTION, SOLUTION INTRAMUSCULAR; INTRAVENOUS PRN
Status: DISCONTINUED | OUTPATIENT
Start: 2024-01-09 | End: 2024-01-09 | Stop reason: SDUPTHER

## 2024-01-09 RX ADMIN — SODIUM CHLORIDE, POTASSIUM CHLORIDE, SODIUM LACTATE AND CALCIUM CHLORIDE: 600; 310; 30; 20 INJECTION, SOLUTION INTRAVENOUS at 07:06

## 2024-01-09 RX ADMIN — PROPOFOL INJECTABLE EMULSION 100 MCG/KG/MIN: 10 INJECTION, EMULSION INTRAVENOUS at 07:44

## 2024-01-09 RX ADMIN — OXYCODONE AND ACETAMINOPHEN 1 TABLET: 5; 325 TABLET ORAL at 08:51

## 2024-01-09 RX ADMIN — LIDOCAINE HYDROCHLORIDE 20 MG: 20 INJECTION, SOLUTION INTRAVENOUS at 07:44

## 2024-01-09 RX ADMIN — Medication 30 MG: at 07:47

## 2024-01-09 RX ADMIN — FENTANYL CITRATE 50 MCG: 50 INJECTION INTRAMUSCULAR; INTRAVENOUS at 07:44

## 2024-01-09 RX ADMIN — DIPHENHYDRAMINE HYDROCHLORIDE 12.5 MG: 50 INJECTION INTRAMUSCULAR; INTRAVENOUS at 07:39

## 2024-01-09 RX ADMIN — DEXAMETHASONE SODIUM PHOSPHATE 10 MG: 10 INJECTION INTRAMUSCULAR; INTRAVENOUS at 07:44

## 2024-01-09 RX ADMIN — PROPOFOL INJECTABLE EMULSION 50 MG: 10 INJECTION, EMULSION INTRAVENOUS at 08:00

## 2024-01-09 RX ADMIN — CEFAZOLIN 2000 MG: 2 INJECTION, POWDER, FOR SOLUTION INTRAMUSCULAR; INTRAVENOUS at 07:41

## 2024-01-09 RX ADMIN — MIDAZOLAM 2 MG: 1 INJECTION INTRAMUSCULAR; INTRAVENOUS at 07:39

## 2024-01-09 RX ADMIN — ONDANSETRON 4 MG: 2 INJECTION INTRAMUSCULAR; INTRAVENOUS at 07:39

## 2024-01-09 ASSESSMENT — PAIN DESCRIPTION - DESCRIPTORS
DESCRIPTORS: SORE
DESCRIPTORS: ACHING
DESCRIPTORS: ACHING

## 2024-01-09 ASSESSMENT — PAIN - FUNCTIONAL ASSESSMENT
PAIN_FUNCTIONAL_ASSESSMENT: 0-10

## 2024-01-09 ASSESSMENT — PAIN SCALES - GENERAL: PAINLEVEL_OUTOF10: 6

## 2024-01-09 NOTE — BRIEF OP NOTE
Brief Postoperative Note      Patient: Zahra Cavazos  YOB: 1965  MRN: [id number]    Date of Procedure: 1/9/2024    Pre-Op Diagnosis Codes:     * Acquired deformity of left toe [M20.62]    Post-Op Diagnosis: Same       Procedure(s):  MODIFIED CORDOBA BUNIONECTOMY WITH ALONA OSTEOTOMY AND AKIN OSTEOTOMY LEFT FOOT    Surgeon(s):  Chau Ornelas DPM    Assistant:  Allie Javier DPM, PGY-2     Anesthesia: Monitor Anesthesia Care    Estimated Blood Loss (mL): Minimal    Complications: None    Specimens:   * No specimens in log *    Implants:  Implant Name Type Inv. Item Serial No.  Lot No. LRB No. Used Action   STAPLE BNE [id number] NIT SHP MEM COMPRESSIVE FOR DISP - [id number]  STAPLE BNE [id number] NIT SHP MEM COMPRESSIVE FORC DISP  [employer] [facility]- [id number] Left 1 Implanted   [employer] DOUBLE [employer] - [id number]  [employer] DOUBLE POINT  [employer] [facility]  Left 2 Implanted         Drains: * No LDAs found *    Findings: consistent with operative note       Electronically signed by Allie Javier DPM on 1/9/2024 at 8:31 AM

## 2024-01-09 NOTE — H&P
Review of Systems    Physical Exam The pt was examined by indio there are no changes from previous h amd p the pt is cleared for surgery

## 2024-01-09 NOTE — DISCHARGE INSTRUCTIONS
[facility]    Chau Ornelas DPM, MARCUS  [address], [address]  [place] [address]    [phone]  Fax [phone]      POST-OPERATIVE INSTRUCTIONS      Use medication ONLY as directed.  Since some dizziness may occur, avoid walking without assistance.  Eat first, wait 15 minutes, and take your medication.  Eat all meals to prevent nausea.    DO NOT CHANGE THE DRESSING AND DO NOT GET THE DRESSING WET.  Do not be alarmed if some blood appears on the bandage.  If blood soaks through, add dry sterile gauze to your bandage.    Keep the foot elevated by placing a pillow under it.  Elevate to heart level.    Use ice on the inside of the ankle or behind the knee, 20 minutes on and 20 minutes off, for the first 48 hours.  This will reduce pain and help control swelling.  DO NOT use hot water bags or electric heating pads.    Take your temperature at 4:00 PM(98.6* F is usually normal, but smoking, eating, and drinking will alter this for at least one-half hour).    YOU now control your own recovery.  The closer you follow the instructions, the easy your recovery will be.    Keep all post-operative appointments with this office.  They are instrumental to your recovery.  Please dispense  crutches ,surgical shoe,weight bearing to tolerable      Should you have any undue discomfort, swelling, or bleeding, the doctor may be reached at:  Chau Ornelas DPM, MARCUS  Cell phone: [phone]

## 2024-01-09 NOTE — OP NOTE
Operative Note      Patient: Zahra Cavazos  YOB: 1965  MRN: [id number]    Date of Procedure: 1/9/2024    Pre-Op Diagnosis Codes:     * Acquired deformity of left toe [M20.62]    Post-Op Diagnosis: Same       Procedure(s):  MODIFIED CORDOBA BUNIONECTOMY WITH ALONA OSTEOTOMY AND AKIN OSTEOTOMY LEFT FOOT    Surgeon(s):  Chau Ornelas DPM    Assistant:   Allie Javier DPM, PGY-2     Anesthesia: Monitor Anesthesia Care    Estimated Blood Loss (mL): Minimal    Complications: None    Specimens:   * No specimens in log *    Implants:  Implant Name Type Inv. Item Serial No.  Lot No. LRB No. Used Action   STAPLE BNE [id number] NIT SHP MEM COMPRESSIVE FOR DISP - [id number]  STAPLE BNE [id number] NIT SHP MEM COMPRESSIVE FORC DISP  [employer]- [id number] Left 1 Implanted   [employer] DOUBLE [employer]INT - [id number]  [employer] D[employer]  [employer] [facility]  Left 2 Implanted         Drains: * No LDAs found *    Findings:   Indications for procedure:   Limited range of motion to the first MTPJ of the left foot in both dorsiflexion and plantarflexion.  On radiographic evaluation mild bunion deformity noted and to be less than 16 degrees.  Ankle and subtalar joint range of motion within normal limits.  On radiographic evaluation mild arthritic changes noted to the first MTPJ.  Calcaneal inclination and calcaneal declination angle within normal limits on radiographic examination.  No hypermobility noted to the first TMT J joint.        Detailed Description of Procedure:   Following satisfactory preop evaluation the patient was brought into the OR and placed on the OR table in the supine position.  MAC sedation was administered by anesthesia.  Following sedation a well-padded pneumatic tourniquet was applied to the left ankle.  Local anesthetic consisting of 10 cc of 0.5% Marcaine plain was then injected into the left foot in a Esquivel block fashion.  The foot was then prepped and

## 2024-01-09 NOTE — ANESTHESIA POSTPROCEDURE EVALUATION
Department of Anesthesiology  Postprocedure Note    Patient: Zahra Cavazos  MRN: [id number]  YOB: 1965  Date of evaluation: 1/9/2024    Procedure Summary       Date: 01/09/24 Room / Location: [address]    Anesthesia Start: 0738 Anesthesia Stop: 0829    Procedure: MODIFIED CORDOBA BUNIONECTOMY WITH ALONA OSTEOTOMY AND AKIN OSTEOTOMY LEFT FOOT (Left: Foot) Diagnosis:       Acquired deformity of left toe      (Acquired deformity of left toe [M20.62])    Surgeons: Chau Ornelas DPM Responsible Provider: Alejandro Ulloa MD    Anesthesia Type: MAC ASA Status: 2            Anesthesia Type: MAC    Haseeb Phase I: Haeseb Score: 10    Haseeb Phase II: Haseeb Score: 10    Anesthesia Post Evaluation    Patient location during evaluation: PACU  Patient participation: complete - patient participated  Level of consciousness: awake and alert  Airway patency: patent  Nausea & Vomiting: no nausea and no vomiting  Cardiovascular status: hemodynamically stable  Respiratory status: room air and spontaneous ventilation  Hydration status: stable  Pain management: adequate    No notable events documented.

## 2024-09-04 ENCOUNTER — TELEPHONE (OUTPATIENT)
Dept: SLEEP CENTER | Age: 59
End: 2024-09-04

## 2024-09-05 DIAGNOSIS — G47.33 OSA (OBSTRUCTIVE SLEEP APNEA): Primary | ICD-10-CM

## 2025-06-30 ENCOUNTER — TELEPHONE (OUTPATIENT)
Dept: SLEEP CENTER | Age: 60
End: 2025-06-30

## 2025-06-30 NOTE — TELEPHONE ENCOUNTER
Call placed to patient to attempt to schedule sleep study per provider order. No answer. Msg left requesting call back.

## (undated) DEVICE — NEPTUNE E-SEP SMOKE EVACUATION PENCIL, COATED, 70MM BLADE, PUSH BUTTON SWITCH: Brand: NEPTUNE E-SEP

## (undated) DEVICE — BANDAGE,GAUZE,CONFORMING,3"X75",STRL,LF: Brand: MEDLINE

## (undated) DEVICE — SUTURE VCRL SZ 3-0 L18IN ABSRB UD PS-2 L19MM 1/2 CIR J497G

## (undated) DEVICE — INTENDED FOR TISSUE SEPARATION, AND OTHER PROCEDURES THAT REQUIRE A SHARP SURGICAL BLADE TO PUNCTURE OR CUT.: Brand: BARD-PARKER ® STAINLESS STEEL BLADES

## (undated) DEVICE — Z INACTIVE USE 2855128 SPONGE GZ 16 PLY WVN COT 4INX4IN  HHH

## (undated) DEVICE — PACK SURG BASIC I LF

## (undated) DEVICE — DOUBLE BASIN SET: Brand: MEDLINE INDUSTRIES, INC.

## (undated) DEVICE — TUBING, SUCTION, 1/4" X 10', STRAIGHT: Brand: MEDLINE

## (undated) DEVICE — TOWEL,OR,DSP,ST,BLUE,STD,6/PK,12PK/CS: Brand: MEDLINE

## (undated) DEVICE — DRAPE 64X41IN RADIOLOGY C ARM EQUIP STER

## (undated) DEVICE — STRIP,CLOSURE,WOUND,MEDI-STRIP,1/4X3: Brand: MEDLINE

## (undated) DEVICE — TUBERCULIN SAFETY SYRINGE WITH NEEDLE: Brand: MONOJECT

## (undated) DEVICE — SYRINGE, LUER LOCK, 10ML: Brand: MEDLINE

## (undated) DEVICE — CLOTH SKIN PREP 2% CHG

## (undated) DEVICE — NEEDLE HYPO 25GA L1.5IN BLU POLYPR HUB S STL REG BVL STR

## (undated) DEVICE — ELECTRODE PT RET AD L9FT HI MOIST COND ADH HYDRGEL CORDED

## (undated) DEVICE — SUTURE ETHLN SZ 4-0 L18IN NONABSORBABLE BLK L19MM PS-2 3/8 1667H

## (undated) DEVICE — NDL CNTR 40CT FM MAG: Brand: MEDLINE INDUSTRIES, INC.

## (undated) DEVICE — COVER,LIGHT HANDLE,FLX,2/PK: Brand: MEDLINE INDUSTRIES, INC.

## (undated) DEVICE — Z INACTIVE NO ACTIVE SUPPLIER APPLICATOR MEDICATED 26 CC TINT HI-LITE ORNG STRL CHLORAPREP

## (undated) DEVICE — GLOVE ORANGE PI 7 1/2   MSG9075

## (undated) DEVICE — MARKER,SKIN,WI/RULER AND LABELS: Brand: MEDLINE

## (undated) DEVICE — NEEDLE HYPO 18GA L1.5IN PNK POLYPR HUB S STL REG BVL STR

## (undated) DEVICE — ZIMMER® STERILE DISPOSABLE TOURNIQUET CUFF WITH PROTECTIVE SLEEVE AND PLC, DUAL PORT, SINGLE BLADDER, 18 IN. (46 CM)

## (undated) DEVICE — GOWN,SIRUS,NONRNF,SETINSLV,XL,20/CS: Brand: MEDLINE

## (undated) DEVICE — DRAPE,EXTREMITY,89X128,STERILE: Brand: MEDLINE

## (undated) DEVICE — DRESSING PETRO W3XL8IN OIL EMUL N ADH GZ KNIT IMPREG CELOS